# Patient Record
Sex: FEMALE | Race: WHITE | HISPANIC OR LATINO | URBAN - METROPOLITAN AREA
[De-identification: names, ages, dates, MRNs, and addresses within clinical notes are randomized per-mention and may not be internally consistent; named-entity substitution may affect disease eponyms.]

---

## 2018-01-03 ENCOUNTER — APPOINTMENT (RX ONLY)
Dept: URBAN - METROPOLITAN AREA CLINIC 157 | Facility: CLINIC | Age: 38
Setting detail: DERMATOLOGY
End: 2018-01-03

## 2018-01-03 VITALS — HEART RATE: 70 BPM | WEIGHT: 175 LBS | SYSTOLIC BLOOD PRESSURE: 103 MMHG | DIASTOLIC BLOOD PRESSURE: 67 MMHG

## 2018-01-03 DIAGNOSIS — Z71.89 OTHER SPECIFIED COUNSELING: ICD-10-CM

## 2018-01-03 DIAGNOSIS — B36.0 PITYRIASIS VERSICOLOR: ICD-10-CM

## 2018-01-03 PROCEDURE — 99202 OFFICE O/P NEW SF 15 MIN: CPT

## 2018-01-03 PROCEDURE — ? COUNSELING

## 2018-01-03 PROCEDURE — ? KOH PREP

## 2018-01-03 PROCEDURE — ? TREATMENT REGIMEN

## 2018-01-03 PROCEDURE — ? EDUCATIONAL RESOURCES PROVIDED

## 2018-01-03 PROCEDURE — 87220 TISSUE EXAM FOR FUNGI: CPT

## 2018-01-03 ASSESSMENT — LOCATION SIMPLE DESCRIPTION DERM: LOCATION SIMPLE: LEFT ANTERIOR NECK

## 2018-01-03 ASSESSMENT — LOCATION DETAILED DESCRIPTION DERM: LOCATION DETAILED: LEFT CLAVICULAR NECK

## 2018-01-03 ASSESSMENT — LOCATION ZONE DERM: LOCATION ZONE: NECK

## 2018-01-03 NOTE — PROCEDURE: TREATMENT REGIMEN
Samples Given: Xolegel 2% gel apply by topical route twice a day for 2 weeks
Otc Regimen: Selsun Blue shampoo apply by topical route to the affected areas on the left shoulder twice weekly, lather, let sit 5 minutes, then rinse off with water
Detail Level: Detailed

## 2018-01-03 NOTE — PROCEDURE: KOH PREP
Koh Intro Text (From The.....): A KOH prep was ordered and evaluated from the
Detail Level: Detailed
Showing: spores with pseudohyphae